# Patient Record
Sex: FEMALE | Race: ASIAN | NOT HISPANIC OR LATINO | URBAN - METROPOLITAN AREA
[De-identification: names, ages, dates, MRNs, and addresses within clinical notes are randomized per-mention and may not be internally consistent; named-entity substitution may affect disease eponyms.]

---

## 2017-10-19 ENCOUNTER — OFFICE VISIT (OUTPATIENT)
Dept: CARDIOLOGY | Facility: CLINIC | Age: 57
End: 2017-10-19
Payer: COMMERCIAL

## 2017-10-19 VITALS — SYSTOLIC BLOOD PRESSURE: 153 MMHG | OXYGEN SATURATION: 98 % | DIASTOLIC BLOOD PRESSURE: 77 MMHG | HEART RATE: 87 BPM

## 2017-10-19 DIAGNOSIS — I10 ESSENTIAL HYPERTENSION: ICD-10-CM

## 2017-10-19 DIAGNOSIS — I12.9 RENAL HYPERTENSION: Primary | ICD-10-CM

## 2017-10-19 PROCEDURE — 99205 OFFICE O/P NEW HI 60 MIN: CPT | Performed by: INTERNAL MEDICINE

## 2017-10-19 RX ORDER — MULTIPLE VITAMINS W/ MINERALS TAB 9MG-400MCG
1 TAB ORAL DAILY
COMMUNITY

## 2017-10-19 RX ORDER — CLONIDINE HYDROCHLORIDE 0.1 MG/1
0.1 TABLET ORAL 2 TIMES DAILY
COMMUNITY

## 2017-10-19 RX ORDER — POTASSIUM CHLORIDE 1.5 G/1.58G
20 POWDER, FOR SOLUTION ORAL 2 TIMES DAILY
COMMUNITY

## 2017-10-19 RX ORDER — AMLODIPINE BESYLATE 5 MG/1
5 TABLET ORAL DAILY
Qty: 90 TABLET | Refills: 3 | Status: SHIPPED | OUTPATIENT
Start: 2017-10-19

## 2017-10-19 ASSESSMENT — PAIN SCALES - GENERAL: PAINLEVEL: NO PAIN (0)

## 2017-10-19 NOTE — LETTER
Date:October 20, 2017      Patient was self referred, no letter generated. Do not send.        [facility] Health Information

## 2017-10-19 NOTE — PROGRESS NOTES
"   SUBJECTIVE:  Silverio Andino is a 57 year old female who presents for establishing care.    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  Quit job due to increasing workload and frequent ED visits. Read a lot about female menopause and increased incidence of heart attacks in females. She believe that she is a \"walking time bomb\"Provider, please review the patient's drug allergy history. Some issues need clarification. BP toomany times a day.    She feels her leg arteries closing down.Then get tight feeling over lrft shoulder and then across the chest.This is the sign according to her an indication of high BP.She check the BP and it will be high and go to ED. There they observe her for 6 hours after a dose of clonidine and send her back home.    Now worried whether her heart and kidneys are getting enough blood.    Very active lady. No symptoms at activity.Swin 3 days a week.very active.    No risk factors.    There are no active problems to display for this patient.   .  Current Outpatient Prescriptions   Medication Sig     potassium chloride (KLOR-CON) 20 MEQ Packet Take 20 mEq by mouth 2 times daily     cloNIDine (CATAPRES) 0.1 MG tablet Take 0.1 mg by mouth 2 times daily     ESCITALOPRAM OXALATE PO Take 10 mg by mouth daily     LISINOPRIL PO Take 20 mg by mouth daily     multivitamin, therapeutic with minerals (MULTI-VITAMIN) TABS tablet Take 1 tablet by mouth daily     aspirin 81 MG EC tablet Take 81 mg by mouth daily     Cholecalciferol (VITAMIN D PO)      amLODIPine (NORVASC) 5 MG tablet Take 1 tablet (5 mg) by mouth daily     No current facility-administered medications for this visit.      History reviewed. No pertinent past medical history.  History reviewed. No pertinent surgical history.  Not on File  Social History     Social History     Marital status:      Spouse name: N/A     Number of children: N/A     Years of education: N/A     Occupational History     Not on file.     Social " History Main Topics     Smoking status: Never Smoker     Smokeless tobacco: Never Used     Alcohol use Yes      Comment: rare     Drug use: No     Sexual activity: Not on file     Other Topics Concern     Not on file     Social History Narrative     No narrative on file     Family History   Problem Relation Age of Onset     Hypertension Mother      Hyperlipidemia Mother      CEREBROVASCULAR DISEASE Father      TIA          REVIEW OF SYSTEMS:  General: negative, fever, chills, night sweats  Skin: negative, acne, rash and scaling  Eyes: negative, double vision, eye pain and photophobia  Ears/Nose/Throat: negative, nasal congestion and purulent rhinorrhea  Respiratory: No dyspnea on exertion, No cough, No hemoptysis and negative  Cardiovascular: negative, palpitations, tachycardia, irregular heart beat, exertional chest pain or pressure, paroxysmal nocturnal dyspnea, dyspnea on exertion and orthopnea  Gastrointestinal: negative, dysphagia, nausea and vomiting  Genitourinary: negative, nocturia, dysuria and frequency  Musculoskeletal: negative, fracture, back pain, neck pain, arthritis and joint pain  Neurologic: negative, headaches, syncope, stroke and seizures  Psychiatric: negative, nervous breakdown, thoughts of self-harm and thoughts of hurting someone else  Hematologic/Lymphatic/Immunologic: negative, bleeding disorder, chills and fever  Endocrine: negative, cold intolerance, heat intolerance and hot flashes       OBJECTIVE:  Blood pressure 153/77, pulse 87, SpO2 98 %.  General Appearance: healthy, alert, active and no distress  Head: Normocephalic. No masses, lesions, tenderness or abnormalities  Eyes: conjuctiva clear, PERRL, EOM intact  Ears: External ears normal. Canals clear. TM's normal.  Nose: Nares normal  Mouth: normal  Neck: Supple, no cervical adenopathy, no thyromegaly  Lungs: clear to auscultation  Cardiac: regular rate and rhythm, normal S1 and S2, no murmur  Abdomen: Soft, nontender.  Normal bowel  sounds.  No hepatosplenomegaly or abnormal masses  Extremities: no peripheral edema, peripheral pulses normal  Musculoskeletal: negative  Neurological: Cranial nerves 2-12 intact, motor strength intact       ASSESSMENT/PLAN:  57 yr old healthy,active female with atypical symptoms and major health concerns.  No cardiac risk factors.  No exertional symptoms.  Very anxious and very worried about heart attack due to post menopausal status.  Reviewed records nanl [facility].  Echo-normal. No LVH.  Labs normal.  EKG normal.   Discussed in length. Most of her problems are related to anxiety and extra knowledge gathered from internet about problems of post menopausal women.  I feel if she can caimdown,may not require any HTN meds.  Discussed meditation.  Adv not to check BP many times and limit to once or twice a week.  Do have major depression. So metoprolol is not an ideal drug.  Has multiple drugs none in optimal dose.  Will discontinue Metoprolol and Thiazide(Very concerned about her Hypokalemia).  Continue Lisinopril at 10 BID. Will add norvasc 5mg daily in place of diuretic and metoprolol.  Clearly told the patient, if she get anxious het BP will be elevated.  Will get Renal US and a stress echo as she was advised to have these at the time of ED visit last week.She strongly believe this is needed though no indication for it at this time.  Per orders.   Return to Clinic 6months .

## 2017-10-19 NOTE — NURSING NOTE
Chief Complaint   Patient presents with     [facility]     Hospital f/u for High bp and Chest pain. Pt reports first episode of CP in 2014 sx with tingling, numbness, radiated up arm. Most recent happend on 9/5 and 10/11. NO SOB. Has worn a heart monitor in the past. Still feels heart racing and fluttering sometimes and increased fatigue.       Initial /77 (BP Location: Left arm, Patient Position: Chair, Cuff Size: Adult Small)  Pulse 87  SpO2 98% There is no height or weight on file to calculate BMI..  BP completed using cuff size: regular    Theresa Aguillon CMA

## 2017-10-19 NOTE — LETTER
"10/19/2017      RE: Silverio nAdino  [address] [address]       Dear Colleague,    Thank you for the opportunity to participate in the care of your patient, Silverio Andino, at the [facility] PHYSIC[facility]ANS HEART AT [facility]. Please see a copy of my visit note below.       SUBJECTIVE:  Silverio Andino is a 57 year old female who presents for establishing care.    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 Read a lot about female menopause and increased incidence of heart attacks in females. She believe that she is a \"walking time bomb\"Provider, please review the patient's drug allergy history. Some issues need clarification. BP toomany times a day.    She feels her leg arteries closing down.Then get tight feeling over lrft shoulder and then across the chest.This is the sign according to her an indication of high BP.She check the BP and it will be high and go to ED. There they observe her for 6 hours after a dose of clonidine and send her back home.    Now worried whether her heart and kidneys are getting enough blood.    Very active lady. No symptoms at activity.Swin 3 days a week.very active.    No risk factors.    There are no active problems to display for this patient.   .  Current Outpatient Prescriptions   Medication Sig     potassium chloride (KLOR-CON) 20 MEQ Packet Take 20 mEq by mouth 2 times daily     cloNIDine (CATAPRES) 0.1 MG tablet Take 0.1 mg by mouth 2 times daily     ESCITALOPRAM OXALATE PO Take 10 mg by mouth daily     LISINOPRIL PO Take 20 mg by mouth daily     multivitamin, therapeutic with minerals (MULTI-VITAMIN) TABS tablet Take 1 tablet by mouth daily     aspirin 81 MG EC tablet Take 81 mg by mouth daily     Cholecalciferol (VITAMIN D PO)      amLODIPine (NORVASC) 5 MG tablet Take 1 tablet (5 mg) by mouth daily     No current " facility-administered medications for this visit.      History reviewed. No pertinent past medical history.  History reviewed. No pertinent surgical history.  Not on File  Social History     Social History     Marital status:      Spouse name: N/A     Number of children: N/A     Years of education: N/A     Occupational History     Not on file.     Social History Main Topics     Smoking status: Never Smoker     Smokeless tobacco: Never Used     Alcohol use Yes      Comment: rare     Drug use: No     Sexual activity: Not on file     Other Topics Concern     Not on file     Social History Narrative     No narrative on file     Family History   Problem Relation Age of Onset     Hypertension Mother      Hyperlipidemia Mother      CEREBROVASCULAR DISEASE Father      TIA          REVIEW OF SYSTEMS:  General: negative, fever, chills, night sweats  Skin: negative, acne, rash and scaling  Eyes: negative, double vision, eye pain and photophobia  Ears/Nose/Throat: negative, nasal congestion and purulent rhinorrhea  Respiratory: No dyspnea on exertion, No cough, No hemoptysis and negative  Cardiovascular: negative, palpitations, tachycardia, irregular heart beat, exertional chest pain or pressure, paroxysmal nocturnal dyspnea, dyspnea on exertion and orthopnea  Gastrointestinal: negative, dysphagia, nausea and vomiting  Genitourinary: negative, nocturia, dysuria and frequency  Musculoskeletal: negative, fracture, back pain, neck pain, arthritis and joint pain  Neurologic: negative, headaches, syncope, stroke and seizures  Psychiatric: negative, nervous breakdown, thoughts of self-harm and thoughts of hurting someone else  Hematologic/Lymphatic/Immunologic: negative, bleeding disorder, chills and fever  Endocrine: negative, cold intolerance, heat intolerance and hot flashes       OBJECTIVE:  Blood pressure 153/77, pulse 87, SpO2 98 %.  General Appearance: healthy, alert, active and no distress  Head: Normocephalic. No  masses, lesions, tenderness or abnormalities  Eyes: conjuctiva clear, PERRL, EOM intact  Ears: External ears normal. Canals clear. TM's normal.  Nose: Nares normal  Mouth: normal  Neck: Supple, no cervical adenopathy, no thyromegaly  Lungs: clear to auscultation  Cardiac: regular rate and rhythm, normal S1 and S2, no murmur  Abdomen: Soft, nontender.  Normal bowel sounds.  No hepatosplenomegaly or abnormal masses  Extremities: no peripheral edema, peripheral pulses normal  Musculoskeletal: negative  Neurological: Cranial nerves 2-12 intact, motor strength intact       ASSESSMENT/PLAN:  57 yr old healthy,active female with atypical symptoms and major health concerns.  No cardiac risk factors.  No exertional symptoms.  Very anxious and very worried about heart attack due to post menopausal status.  Reviewed records frol [facility].  Echo-normal. No LVH.  Labs normal.  EKG normal.   Discussed in length. Most of her problems are related to anxiety and extra knowledge gathered from internet about problems of post menopausal women.  I feel if she can caimdown,may not require any HTN meds.  Discussed meditation.  Adv not to check BP many times and limit to once or twice a week.  Do have major depression. So metoprolol is not an ideal drug.  Has multiple drugs none in optimal dose.  Will discontinue Metoprolol and Thiazide(Very concerned about her Hypokalemia).  Continue Lisinopril at 10 BID. Will add norvasc 5mg daily in place of diuretic and metoprolol.  Clearly told the patient, if she get anxious het BP will be elevated.  Will get Renal US and a stress echo as she was advised to have these at the time of ED visit last week.She strongly believe this is needed though no indication for it at this time.  Per orders.   Return to Clinic 6months .    Please do not hesitate to contact me if you have any questions/concerns.     Sincerely,     KEYONA Gaitan MD

## 2017-10-19 NOTE — MR AVS SNAPSHOT
After Visit Summary   10/19/2017    Silverio Andino    MRN: [id number]           Patient Information     Date Of Birth          1960        Visit Information        Provider Department      10/19/2017 10:30 AM KEYONA Gaitan MD [facility] PHYSICIANS HEART AT [facility]        Today's Diagnoses     Renal hypertension    -  1    HTN (hypertension)          Care Instructions    1.  Dr. Tripp is requesting you to discontinue Metoprolol and Hydrocholorothiazide.     2. Begin 5mg/day of Norvasc.    3. You are scheduled for a renal ultrasound on Tuesday, October 24th, 2017 at 10:00am. This is at the [address]    4. Dr. SILAS Tripp has ordered a stress echocardiogram to be performed on you.  This test has been scheduled at the [facility] Imaging Department ([address] [place] [address]) on Monday, October 23rd, 2017 at 2:00p.m.  Please arrive 15 minutes early to allow enough time for registration.     5. We have scheduled you for a cardiac follow up appointment with Dr. SILAS Tripp at the [facility]  ([address] [place] [address]) on Thursday, April 19th, 2018 at 10:00am with a check-in arrival time of 9:45am.  If this appointment conflicts with your schedule, please contact our specialty schedulers at [phone] to reschedule. We look forward to seeing you again.    6. We will follow up with you on the results of your renal ultrasound and check to see how your Blood Pressures have been on the medication changes.    [facility] Cardiology - [place] Location    If you have any questions regarding to your visit please contact your care team:     Cardiology  Telephone Number   Sara Soto  Cardiology RN's.    Justa Aguillon CMA [phone]    After hours: [phone].  [phone]   For scheduling appts:     [phone] or  [phone]    After hours: [phone]   For the Device Clinic (Pacemakers and ICD's)  RN's  :  Tyra Fine   During business hours: [phone]  After business hours:  [phone]- select option 4.      If you need a medication refill please contact your pharmacy.  Please allow 3 business days for your refill to be completed.    As always, Thank you for trusting us with your health care needs!  _____________________________________________________________________              Follow-ups after your visit        Your next 10 appointments already scheduled     Oct 23, 2017  2:00 PM CDT   Ech Stress Test with KING, RONY STRESS RM   [facility] ([facility])    [address] [place] [phone] [phone]           1. Please bring or wear a comfortable two-piece outfit and walking shoes. 2. Stop eating 3 hours before the test. You may drink water or juice. 3. Stop all caffeine 12 hours before the test. This includes coffee, tea, soda pop, chocolate and certain medicines (such as Anacin and Excederin). Also avoid decaf coffee and tea, as these contain small amounts of caffeine. 4. No alcohol, smoking or use of other tobacco products for 12 hours before the test. 5. Refer to your provider instructions to see if you need to stop any medications (such as beta-blockers or nitrates) for this test. 6. For patients with diabetes: - If you take insulin, call your diabetes care team. Ask if you should take a   dose the morning of your test. - If you take diabetes medicine by mouth, dont take it on the morning of your test. Bring it with you to take after the test. (If you have questions, call your diabetes care team) 7. When you arrive, please tell us if: - You have diabetes. - You have taken Viagra, Cialis or Levitra in the past 48 hours. 8. For any questions that cannot be answered, please contact the ordering physician            Oct 24, 2017 10:00 AM CDT   US RENAL COMPLETE with FKUS1   [facility] ([facility]  "[place])    [address] [place] [id number]   [phone]           Please bring a list of your medicines (including vitamins, minerals and over-the-counter drugs). Also, tell your doctor about any allergies you may have. Wear comfortable clothes and leave your valuables at home.  You do not need to do anything special to prepare for your exam.  Please call the Imaging Department at your exam site with any questions.            Apr 19, 2018 10:00 AM CDT   Return Visit with KEYONA Gaitan MD   [facility] PHYSICIANS HEART AT [facility] ([facility])    [address] [place] [id number]   [phone]              Future tests that were ordered for you today     Open Future Orders        Priority Expected Expires Ordered    Exercise Stress Echocardiogram Routine  10/19/2018 10/19/2017    US Renal Complete Routine  10/19/2018 10/19/2017            Who to contact     If you have questions or need follow up information about today's clinic visit or your schedule please contact [facility] PHYSICIANS HEART AT [facility] directly at [phone].  Normal or non-critical lab and imaging results will be communicated to you by MyChart, letter or phone within 4 business days after the clinic has received the results. If you do not hear from us within 7 days, please contact the clinic through [email or web address] or phone. If you have a critical or abnormal lab result, we will notify you by phone as soon as possible.  Submit refill requests through Taktio or call your pharmacy and they will forward the refill request to us. Please allow 3 business days for your refill to be completed.          Additional Information About Your Visit        [email or web address][employer][email or web address] Information     Taktio lets you send messages to your doctor, view your test results, renew your prescriptions, schedule appointments and more. To sign up, go to [email or web address] . Click on \"Log in\" on the " "left side of the screen, which will take you to the Welcome page. Then click on \"Sign up Now\" on the right side of the page.     You will be asked to enter the access code listed below, as well as some personal information. Please follow the directions to create your username and password.     Your access code is: [email or web address]  Expires: 2018 11:27 AM     Your access code will  in 90 days. If you need help or a new code, please call your [place] clinic or [phone].        Care EveryWhere ID     This is your Care EveryWhere ID. This could be used by other organizations to access your [place] medical records  [id number]        Your Vitals Were     Pulse Pulse Oximetry                87 98%           Blood Pressure from Last 3 Encounters:   10/19/17 153/77    Weight from Last 3 Encounters:   No data found for Wt                 Today's Medication Changes          These changes are accurate as of: 10/19/17 11:42 AM.  If you have any questions, ask your nurse or doctor.               Start taking these medicines.        Dose/Directions    amLODIPine 5 MG tablet   Commonly known as:  NORVASC   Used for:  HTN (hypertension)   Started by:  KEYONA Gaitan MD        Dose:  5 mg   Take 1 tablet (5 mg) by mouth daily   Quantity:  90 tablet   Refills:  3         Stop taking these medicines if you haven't already. Please contact your care team if you have questions.     chlorthalidone 12.5 mg Tabs half-tab   Commonly known as:  HYGROTON   Stopped by:  KEOYNA Gaitan MD           METOPROLOL SUCCINATE ER PO   Stopped by:  KEYONA Gaitan MD                Where to get your medicines      These medications were sent to [place] [address] [place] [facility] [address]  [address] [address]     Phone:  [phone]     amLODIPine 5 MG tablet                Primary Care Provider Fax #    Provider Not In System [phone]                Equal Access to Services     [facility] " GAAR : Hadii aad ku hadjúnior Joelali, waaxda luqadaha, qaybta kaalmada adeswapna, waxbraden pa hayeliza de jesuslidiaginny gentile . So [facility] [phone].    ATENCIÓN: Si habla español, tiene a wagner disposición servicios gratuitos de asistencia lingüística. Llame al [phone].    We comply with applicable federal civil rights laws and [place] laws. We do not discriminate on the basis of race, color, national origin, age, disability, sex, sexual orientation, or gender identity.            Thank you!     Thank you for choosing [facility] PHYSICIANS HEART AT [facility]  for your care. Our goal is always to provide you with excellent care. Hearing back from our patients is one way we can continue to improve our services. Please take a few minutes to complete the written survey that you may receive in the mail after your visit with us. Thank you!             Your Updated Medication List - Protect others around you: Learn how to safely use, store and throw away your medicines at www.disposemymeds.org.          This list is accurate as of: 10/19/17 11:42 AM.  Always use your most recent med list.                   Brand Name Dispense Instructions for use Diagnosis    amLODIPine 5 MG tablet    NORVASC    90 tablet    Take 1 tablet (5 mg) by mouth daily    HTN (hypertension)       aspirin 81 MG EC tablet      Take 81 mg by mouth daily        cloNIDine 0.1 MG tablet    CATAPRES     Take 0.1 mg by mouth 2 times daily        ESCITALOPRAM OXALATE PO      Take 10 mg by mouth daily        LISINOPRIL PO      Take 20 mg by mouth daily        Multi-vitamin Tabs tablet      Take 1 tablet by mouth daily        potassium chloride 20 MEQ Packet    KLOR-CON     Take 20 mEq by mouth 2 times daily        VITAMIN D PO

## 2017-10-19 NOTE — PATIENT INSTRUCTIONS
1.  Dr. Tripp is requesting you to discontinue Metoprolol and Hydrocholorothiazide.     2. Begin 5mg/day of Norvasc.    3. You are scheduled for a renal ultrasound on Tuesday, October 24th, 2017 at 10:00am. This is at the [address]    4. Dr. SILAS Tripp has ordered a stress echocardiogram to be performed on you.  This test has been scheduled at the [facility] Imaging Department ([address] [place] [address]) on Monday, October 23rd, 2017 at 2:00p.m.  Please arrive 15 minutes early to allow enough time for registration.     5. We have scheduled you for a cardiac follow up appointment with Dr. SILAS Tripp at the [facility]  ([address] [place] [address]) on Thursday, April 19th, 2018 at 10:00am with a check-in arrival time of 9:45am.  If this appointment conflicts with your schedule, please contact our specialty schedulers at [phone] to reschedule. We look forward to seeing you again.    6. We will follow up with you on the results of your renal ultrasound and check to see how your Blood Pressures have been on the medication changes.    [facility] Cardiology - [place] Location    If you have any questions regarding to your visit please contact your care team:     Cardiology  Telephone Number   Sara Soto  Cardiology RN's.    Justa Aguillon CMA [phone]    After hours: [phone].  [phone]   For scheduling appts:     [phone] or  [phone]    After hours: [phone]   For the Device Clinic (Pacemakers and ICD's)  RN's :  Tyra Fine   During business hours: [phone]  After business hours:  [phone]- select option 4.      If you need a medication refill please contact your pharmacy.  Please allow 3 business days for your refill to be completed.    As always, Thank you for trusting us with your health care needs!  _____________________________________________________________________

## 2017-10-23 ENCOUNTER — RADIANT APPOINTMENT (OUTPATIENT)
Dept: CARDIOLOGY | Facility: CLINIC | Age: 57
End: 2017-10-23
Attending: INTERNAL MEDICINE
Payer: COMMERCIAL

## 2017-10-23 DIAGNOSIS — I10 ESSENTIAL HYPERTENSION: ICD-10-CM

## 2017-10-23 PROCEDURE — 93325 DOPPLER ECHO COLOR FLOW MAPG: CPT | Mod: 26 | Performed by: INTERNAL MEDICINE

## 2017-10-23 PROCEDURE — 93321 DOPPLER ECHO F-UP/LMTD STD: CPT | Mod: 26 | Performed by: INTERNAL MEDICINE

## 2017-10-23 PROCEDURE — 93350 STRESS TTE ONLY: CPT | Mod: TC | Performed by: INTERNAL MEDICINE

## 2017-10-23 PROCEDURE — 93350 STRESS TTE ONLY: CPT | Mod: 26 | Performed by: INTERNAL MEDICINE

## 2017-10-23 PROCEDURE — 93017 CV STRESS TEST TRACING ONLY: CPT | Performed by: INTERNAL MEDICINE

## 2017-10-23 PROCEDURE — 93352 ADMIN ECG CONTRAST AGENT: CPT | Performed by: INTERNAL MEDICINE

## 2017-10-23 PROCEDURE — 40000264 ECHO STRESS WITH OPTISON: Performed by: INTERNAL MEDICINE

## 2017-10-23 PROCEDURE — 93016 CV STRESS TEST SUPVJ ONLY: CPT | Performed by: INTERNAL MEDICINE

## 2017-10-23 PROCEDURE — 93018 CV STRESS TEST I&R ONLY: CPT | Performed by: INTERNAL MEDICINE

## 2017-10-23 PROCEDURE — 93325 DOPPLER ECHO COLOR FLOW MAPG: CPT | Mod: TC | Performed by: INTERNAL MEDICINE

## 2017-10-23 PROCEDURE — 93321 DOPPLER ECHO F-UP/LMTD STD: CPT | Mod: TC | Performed by: INTERNAL MEDICINE

## 2017-10-23 RX ADMIN — Medication 3 ML: at 14:15

## 2017-10-23 NOTE — NURSING NOTE
Bicycle Stress Echocardiogram with Optison performed.  IV started in [facility].    Optison:   3ml Optison mixed with 6ml Saline - [id number]  Amount used: 3.0ml, 6.0ml wasted

## 2017-10-24 ENCOUNTER — RADIANT APPOINTMENT (OUTPATIENT)
Dept: ULTRASOUND IMAGING | Facility: CLINIC | Age: 57
End: 2017-10-24
Attending: INTERNAL MEDICINE
Payer: COMMERCIAL

## 2017-10-24 DIAGNOSIS — I10 ESSENTIAL HYPERTENSION: ICD-10-CM

## 2017-10-24 PROCEDURE — 76770 US EXAM ABDO BACK WALL COMP: CPT

## 2017-10-25 ENCOUNTER — TELEPHONE (OUTPATIENT)
Dept: CARDIOLOGY | Facility: CLINIC | Age: 57
End: 2017-10-25

## 2021-05-24 ENCOUNTER — RECORDS - HEALTHEAST (OUTPATIENT)
Dept: ADMINISTRATIVE | Facility: CLINIC | Age: 61
End: 2021-05-24

## 2024-02-19 NOTE — NURSING NOTE
Med Reconcile: Reviewed and verified all current medications with the patient. The updated medication list was printed and given to the patient.    Pt has stress echo on 10-13 at 2:00pm and renal US on 10-14 in [place].    New Medication: Patient was educated regarding newly prescribed medication, Amlodpine 5mg/day,  including discussion of  the indication, administration, side effects, and when to report to MD or RN. Patient demonstrated understanding of this information and agreed to call with further questions or concerns.    Return Appointment: Patient given instructions regarding scheduling next clinic visit, in 6 months with Christopher Thomas. Patient demonstrated understanding of this information and agreed to call with further questions or concerns.    Medication Change: Patient was educated regarding prescribed medication change, stop potassium, HCTZ and metoprolol today, including discussion of the indication, administration, side effects, and when to report to MD or RN. Patient demonstrated understanding of this information and agreed to call with further questions or concerns.    Patient stated she understood all health information given and agreed to call with further questions or concerns.    Sara Alonso, RN       Pt. Is here for weight and dizziness.